# Patient Record
Sex: FEMALE | Race: WHITE | HISPANIC OR LATINO | Employment: PART TIME | URBAN - METROPOLITAN AREA
[De-identification: names, ages, dates, MRNs, and addresses within clinical notes are randomized per-mention and may not be internally consistent; named-entity substitution may affect disease eponyms.]

---
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
## 2025-07-01 NOTE — PROGRESS NOTES
S) Pt is a 18 y.o.   at 20w0d  gestation. Routine prenatal care today. Complains of pain during intercourse. Discussed ways to make intercourse more comfortable and what to expect with pregnancy. Also discussed warning signs and symptoms that should be reported. Has ultrasound scheduled today.  labor precautions reviewed, all questions answered.    Fetal movement Normal  Cramping no  VB no  LOF no   Denies dysuria. Generally feels well today. Good self-care activities identified. Denies headaches, swelling, visual changes, or epigastric pain .     O) Blood pressure 108/60, weight 55.3 kg (122 lb), last menstrual period 2018.        Labs:       PNL: WNL       GCT: too early        AFP: normal       GBS: N/A       Pertinent ultrasound -        Scheduled for today    A) IUP at 20w0d       S=D         Patient Active Problem List    Diagnosis Date Noted   • Encounter for supervision of normal pregnancy in teen primigravida, antepartum 2018          SVE: deferred       Chaperone offered: n/a         TDAP: no       FLU: no        BTL: no       : n/a       C/S Consent: n/a       IOL or C/S scheduled: no       LAST PAP: deferred due to age         P) s/s ptl vs general discomforts. Fetal movements reviewed. General ed and anticipatory guidance. Nutrition/exercise/vitamin. Plans breast Plans pp contraception- unsure  Continue PNV.    n/a